# Patient Record
Sex: FEMALE | Race: WHITE | NOT HISPANIC OR LATINO | Employment: OTHER | URBAN - NONMETROPOLITAN AREA
[De-identification: names, ages, dates, MRNs, and addresses within clinical notes are randomized per-mention and may not be internally consistent; named-entity substitution may affect disease eponyms.]

---

## 2019-01-31 ENCOUNTER — MEDICAL CORRESPONDENCE (OUTPATIENT)
Dept: HEALTH INFORMATION MANAGEMENT | Facility: OTHER | Age: 64
End: 2019-01-31

## 2019-01-31 DIAGNOSIS — E78.00 PURE HYPERCHOLESTEROLEMIA: Primary | ICD-10-CM

## 2019-01-31 LAB
CHOLEST SERPL-MCNC: 152 MG/DL
HDLC SERPL-MCNC: 38 MG/DL (ref 23–92)
LDLC SERPL CALC-MCNC: 94 MG/DL
NONHDLC SERPL-MCNC: 114 MG/DL
TRIGL SERPL-MCNC: 101 MG/DL

## 2019-01-31 PROCEDURE — 36415 COLL VENOUS BLD VENIPUNCTURE: CPT

## 2019-01-31 PROCEDURE — 80061 LIPID PANEL: CPT

## 2025-01-04 ENCOUNTER — HOSPITAL ENCOUNTER (OUTPATIENT)
Dept: GENERAL RADIOLOGY | Facility: OTHER | Age: 70
Discharge: HOME | End: 2025-01-04
Attending: REGISTERED NURSE
Payer: COMMERCIAL

## 2025-01-04 ENCOUNTER — OFFICE VISIT (OUTPATIENT)
Dept: FAMILY MEDICINE | Facility: OTHER | Age: 70
End: 2025-01-04
Payer: COMMERCIAL

## 2025-01-04 VITALS
BODY MASS INDEX: 29.52 KG/M2 | DIASTOLIC BLOOD PRESSURE: 60 MMHG | WEIGHT: 166.6 LBS | TEMPERATURE: 97.2 F | SYSTOLIC BLOOD PRESSURE: 122 MMHG | HEIGHT: 63 IN | RESPIRATION RATE: 16 BRPM | OXYGEN SATURATION: 96 % | HEART RATE: 72 BPM

## 2025-01-04 DIAGNOSIS — R06.2 WHEEZING: ICD-10-CM

## 2025-01-04 DIAGNOSIS — J06.9 VIRAL URI WITH COUGH: Primary | ICD-10-CM

## 2025-01-04 DIAGNOSIS — R05.1 ACUTE COUGH: ICD-10-CM

## 2025-01-04 LAB
FLUAV RNA SPEC QL NAA+PROBE: NEGATIVE
FLUBV RNA RESP QL NAA+PROBE: NEGATIVE
RSV RNA SPEC NAA+PROBE: NEGATIVE
SARS-COV-2 RNA RESP QL NAA+PROBE: NEGATIVE

## 2025-01-04 PROCEDURE — 87637 SARSCOV2&INF A&B&RSV AMP PRB: CPT | Mod: ZL | Performed by: REGISTERED NURSE

## 2025-01-04 PROCEDURE — 71046 X-RAY EXAM CHEST 2 VIEWS: CPT

## 2025-01-04 PROCEDURE — G0463 HOSPITAL OUTPT CLINIC VISIT: HCPCS | Mod: 25

## 2025-01-04 RX ORDER — ATORVASTATIN CALCIUM 80 MG/1
1 TABLET, FILM COATED ORAL
COMMUNITY
Start: 2024-12-27

## 2025-01-04 RX ORDER — LEVOTHYROXINE SODIUM 112 UG/1
1 TABLET ORAL
COMMUNITY
Start: 2024-12-03

## 2025-01-04 ASSESSMENT — ENCOUNTER SYMPTOMS
RHINORRHEA: 1
COUGH: 1
HEADACHES: 1

## 2025-01-04 ASSESSMENT — PAIN SCALES - GENERAL: PAINLEVEL_OUTOF10: MILD PAIN (3)

## 2025-01-04 NOTE — PATIENT INSTRUCTIONS
You will be notified of your chest x-ray results via [employer].  If this shows pneumonia, I will treat with an antibiotic as we discussed.  Otherwise, continue with symptomatic care as noted below and follow-up for any new or worsening concerns.      If a COVID swab was performed:     If COVID testing is negative, symptoms are improving (no need for antipyretics/fever reducers, etc.), you can return to normal daily activities.     If you test positive for COVID (regardless of vaccination status): stay home for 5 days. If you have no symptoms or symptoms are resolving after 5 days, you may leave the house per CDC guidelines. Continue to wear a mask around others for 5 days. You should also be fever free for 24 hours without the use of fever reducers (Tylenol/Ibuprofen).     _________________________________    If an influenza swab was performed:    -If Influenza positive, follow school/employer guidelines and you should be fever free for 24 hours without Tylenol or ibuprofen and symptoms improved prior to returning to work or school.  See influenza A handout provided.    _________________________________    If a RSV swab was performed:    -If RSV positive, follow school/employer guidelines and you should be fever free for 24 hours without Tylenol or ibuprofen and symptoms improved prior to returning to work or school.  See RSV handout provided today.    _________________________________    If a pertussis swab was performed:    -Pertussis screening results can take up to 4 days to return  -If Pertussis positive, follow school/employer guidelines and you should be fever free for 24 hours without Tylenol or ibuprofen and symptoms improved prior to returning to work or school.  See RSV handout provided today.    Symptomatic treatments recommended:    - Antibiotics will not help with your symptoms, unless you were told otherwise today (strep throat, ear infection, etc. ).  If you were started on an antibiotic please start  and finish entire course.  - Ensure you are staying hydrated by drinking plenty of fluids.  Continue to eat small amounts as able and advance diet as tolerated  - Honey can be soothing for sore throat (as long as patient is  >12 months of age), in addition to warm salt water gurgles and ibuprofen can help soothe sore throat.  - Humidifier can help with congestion and help keep mucus membranes such as throat and nose from drying out.  - Sleeping slightly propped up can help with congestion and postnasal drainage that can worsen cough at bedtime.  - As long as you have never been told to take Tylenol and/or Ibuprofen you can use them to manage fever and body aches per package instructions  Make sure you eat when you take ibuprofen to avoid stomach upset.  -Coughing is good, coughing move secretions and helps prevent secondary infection such as pneumonia.  Avoid cough suppressants unless needed for sleep then OTC cough medications per package instructions to help with cough. Check to see if the cough/cold medication already has acetaminophen (Tylenol) in it. If it does avoid taking additional Tylenol.  - If sudden onset of new fever, worsening symptoms return for further evaluation.  - OTC antihistamine such as Allegra, Zyrtec, Claritin (generic is okay) can help with nasal/sinus congestion and OTC nasal steroid such as Flonase can help decrease sinus inflammation to help with congestion.    -Monitor your symptoms, if changing/worsening, return to UC/ER or PCP for follow up

## 2025-01-04 NOTE — PROGRESS NOTES
Catrina Richard  1955    ASSESSMENT/PLAN:   1. Viral URI with cough (Primary)    - XR Chest 2 Views  - Influenza A/B, RSV and SARS-CoV2 PCR (COVID-19) Nose    Physical exam reassuring today.  With worsening cough and increased phlegm production, chest x-ray obtained showing no acute concerns.  Viral testing for COVID, influenza and RSV were all negative.  Reviewed symptomatic care and discussed emergent signs and symptoms to monitor for and when to seek follow up for any new or worsening symptoms.     Patient and/or family agrees with plan of care and verbalizes understating. AVS offered and printed if patient requested. Patient education provided verbally and written instructions provided.     SUBJECTIVE:   CHIEF COMPLAINT/ REASON FOR VISIT  Patient presents with:  Cough: Cough x 1 Week      HISTORY OF PRESENT ILLNESS  Catrina Richard is a pleasant 69 year old female presents to [facility] today for evaluation of persistent cough for 1 week.  Symptoms started around 12/26.  She had family visiting, her entire family was sick with similar symptoms.  Initial symptoms including headache, rhinorrhea, cough and diarrhea.  Symptoms overall improved then yesterday cough felt more productive and she overall felt worse.  Fevers have resolved.  Denies nausea, vomiting, further diarrhea or abdominal pain.  She is been using Delsym, Mucinex, Tylenol and ibuprofen.        I have reviewed the nursing notes.  I have reviewed allergies, medication list, problem list, and past medical history.    REVIEW OF SYSTEMS  Review of Systems   HENT:  Positive for congestion and rhinorrhea.    Respiratory:  Positive for cough.    Neurological:  Positive for headaches.   All other systems reviewed and are negative.       VITAL SIGNS  Vitals:    01/04/25 1136   BP: 122/60   BP Location: Right arm   Patient Position: Chair   Cuff Size: Adult Large   Pulse: 72   Resp: 16   Temp: 97.2  F (36.2  C)   TempSrc: Tympanic   SpO2: 96%  "  Weight: 75.6 kg (166 lb 9.6 oz)   Height: 1.6 m (5' 3\")      Body mass index is 29.51 kg/m .    OBJECTIVE:   PHYSICAL EXAM  Physical Exam  Vitals and nursing note reviewed.   Constitutional:       Appearance: Normal appearance. She is not ill-appearing.   HENT:      Right Ear: Tympanic membrane normal.      Left Ear: Tympanic membrane normal.      Nose: No congestion or rhinorrhea.      Mouth/Throat:      Pharynx: No oropharyngeal exudate or posterior oropharyngeal erythema.   Eyes:      Conjunctiva/sclera: Conjunctivae normal.      Pupils: Pupils are equal, round, and reactive to light.   Cardiovascular:      Rate and Rhythm: Normal rate and regular rhythm.   Pulmonary:      Effort: Pulmonary effort is normal.      Breath sounds: Normal breath sounds.   Skin:     General: Skin is dry.      Findings: No rash.   Neurological:      General: No focal deficit present.      Mental Status: She is alert.   Psychiatric:         Mood and Affect: Mood normal.          DIAGNOSTICS  Results for orders placed or performed in visit on 01/04/25   XR Chest 2 Views     Status: None    Narrative    EXAM: XR CHEST 2 VIEWS  LOCATION: [facility]  DATE: 1/4/2025    INDICATION:  Acute cough, Wheezing  COMPARISON: None.      Impression    IMPRESSION: No focal consolidation, pleural effusion or pneumothorax. Cardiomediastinal silhouette is unremarkable.. Mild degenerative changes of the spine.   Influenza A/B, RSV and SARS-CoV2 PCR (COVID-19) Nose     Status: Normal    Specimen: Nose; Swab   Result Value Ref Range    Influenza A PCR Negative Negative    Influenza B PCR Negative Negative    RSV PCR Negative Negative    SARS CoV2 PCR Negative Negative    Narrative    Testing was performed using the Xpert Xpress CoV2/Flu/RSV Assay on the [employer] GeneXpert Instrument. This test should be ordered for the detection of SARS-CoV2, influenza, and RSV viruses in individuals with signs and symptoms of respiratory tract " infection. This test is for in vitro diagnostic use under the [place] FDA for laboratories certified under CLIA to perform high or moderate complexity testing. This test has been [place] FDA cleared. A negative result does not rule out the presence of PCR inhibitors in the specimen or target RNA in concentration below the limit of detection for the assay. If only one viral target is positive but coinfection with multiple targets is suspected, the sample should be re-tested with another FDA cleared, approved, or authorized test, if coninfection would change clinical management. This test was validated by the [facility]. These laboratories are certified under the Clinical Laboratory Improvement Amendments of 1988 (CLIA-88) as qualified to perfom high complexity laboratory testing.        INÉS Bowers [facility]

## 2025-01-04 NOTE — NURSING NOTE
"Chief Complaint   Patient presents with    Cough     Cough x 1 Week        Initial /60 (BP Location: Right arm, Patient Position: Chair, Cuff Size: Adult Large)   Pulse 72   Temp 97.2  F (36.2  C) (Tympanic)   Resp 16   Ht 1.6 m (5' 3\")   Wt 75.6 kg (166 lb 9.6 oz)   SpO2 96%   BMI 29.51 kg/m   Estimated body mass index is 29.51 kg/m  as calculated from the following:    Height as of this encounter: 1.6 m (5' 3\").    Weight as of this encounter: 75.6 kg (166 lb 9.6 oz).      Medication Reconciliation: Complete.       Queta Davila LPN on 1/4/2025 at 11:43 AM     "